# Patient Record
Sex: FEMALE | Race: WHITE | NOT HISPANIC OR LATINO | Employment: FULL TIME | URBAN - METROPOLITAN AREA
[De-identification: names, ages, dates, MRNs, and addresses within clinical notes are randomized per-mention and may not be internally consistent; named-entity substitution may affect disease eponyms.]

---

## 2022-12-05 ENCOUNTER — TRANSFERRED RECORDS (OUTPATIENT)
Dept: MULTI SPECIALTY CLINIC | Facility: CLINIC | Age: 52
End: 2022-12-05

## 2024-02-01 ENCOUNTER — TELEPHONE (OUTPATIENT)
Dept: OBGYN | Facility: CLINIC | Age: 54
End: 2024-02-01
Payer: COMMERCIAL

## 2024-02-08 ENCOUNTER — OFFICE VISIT (OUTPATIENT)
Dept: OBGYN | Facility: CLINIC | Age: 54
End: 2024-02-08
Payer: COMMERCIAL

## 2024-02-08 VITALS
SYSTOLIC BLOOD PRESSURE: 110 MMHG | WEIGHT: 179 LBS | TEMPERATURE: 97.8 F | DIASTOLIC BLOOD PRESSURE: 68 MMHG | HEART RATE: 89 BPM

## 2024-02-08 DIAGNOSIS — N93.9 ABNORMAL UTERINE BLEEDING (AUB): Primary | ICD-10-CM

## 2024-02-08 PROCEDURE — 58100 BIOPSY OF UTERUS LINING: CPT | Performed by: OBSTETRICS & GYNECOLOGY

## 2024-02-08 PROCEDURE — 88305 TISSUE EXAM BY PATHOLOGIST: CPT | Performed by: STUDENT IN AN ORGANIZED HEALTH CARE EDUCATION/TRAINING PROGRAM

## 2024-02-08 PROCEDURE — 99204 OFFICE O/P NEW MOD 45 MIN: CPT | Mod: 25 | Performed by: OBSTETRICS & GYNECOLOGY

## 2024-02-08 RX ORDER — TOPIRAMATE 50 MG/1
1 TABLET, FILM COATED ORAL AT BEDTIME
COMMUNITY
Start: 2023-12-08

## 2024-02-08 RX ORDER — HYDROCORTISONE 25 MG/G
CREAM TOPICAL 2 TIMES DAILY
COMMUNITY
Start: 2023-09-26

## 2024-02-08 RX ORDER — PROPRANOLOL HYDROCHLORIDE 80 MG/1
80 CAPSULE, EXTENDED RELEASE ORAL
COMMUNITY
Start: 2023-12-08

## 2024-02-08 RX ORDER — ASCORBIC ACID 250 MG
250 TABLET,CHEWABLE ORAL DAILY
COMMUNITY

## 2024-02-08 RX ORDER — RIZATRIPTAN BENZOATE 10 MG/1
10 TABLET ORAL
COMMUNITY
Start: 2023-12-08

## 2024-02-08 RX ORDER — TRIAMTERENE/HYDROCHLOROTHIAZID 37.5-25 MG
1 TABLET ORAL DAILY
COMMUNITY
Start: 2023-12-08

## 2024-02-08 RX ORDER — CYCLOSPORINE 0.5 MG/ML
1 EMULSION OPHTHALMIC EVERY 12 HOURS
COMMUNITY
Start: 2023-07-11

## 2024-02-08 RX ORDER — FOLIC ACID 20 MG
CAPSULE ORAL
COMMUNITY

## 2024-02-08 RX ORDER — POTASSIUM CHLORIDE 750 MG/1
10 TABLET, EXTENDED RELEASE ORAL
COMMUNITY
Start: 2023-12-08

## 2024-02-08 NOTE — PROGRESS NOTES
Gynecology Consult Note      HPI: Domonique Salazar is a 53 year old P1 presents for postmenopausal bleeding.  The patient states that she went through menopause a few years ago.  She has had on again off again bleeding over the last couple of years.  She did have a stretch without any bleeding for 1.5 years.  Notes that this year every couple of months she has had episodes of light bleeding.  No other associated symptoms including weight changes, new urinary or bowel symptoms.  She is interested in hysterectomy if bleeding were to continue.  Did have negative endometrial biopsy in 2020 and recent pelvic ultrasound completed.    ROS: 10 pt ROS neg other than HPI    PMH:   No past medical history on file.    PSHx:   No past surgical history on file.    Medications:   ascorbic acid (VITAMIN C) 250 MG CHEW chewable tablet, Take 250 mg by mouth daily  Cyanocobalamin (B-12) 1000 MCG TBCR,   cycloSPORINE (RESTASIS) 0.05 % ophthalmic emulsion, Apply 1 drop to eye every 12 hours  folic acid 20 MG CAPS,   hydrocortisone, Perianal, (ANUSOL-HC) 2.5 % cream, Apply topically 2 times daily  omeprazole (PRILOSEC) 20 MG DR capsule, Take 20 mg by mouth  potassium chloride ER (K-TAB/KLOR-CON) 10 MEQ CR tablet, Take 10 mEq by mouth  propranolol ER (INDERAL LA) 80 MG 24 hr capsule, Take 80 mg by mouth  rizatriptan (MAXALT) 10 MG tablet, Take 10 mg by mouth  topiramate (TOPAMAX) 50 MG tablet, Take 1 tablet by mouth at bedtime  triamterene-HCTZ (MAXZIDE-25) 37.5-25 MG tablet, Take 1 tablet by mouth daily  Vitamin D, Cholecalciferol, 10 MCG (400 UNIT) CHEW,     No current facility-administered medications on file prior to visit.       Allergies:    No Known Allergies    Social History:   Social History     Socioeconomic History    Marital status:      Spouse name: Not on file    Number of children: Not on file    Years of education: Not on file    Highest education level: Not on file   Occupational History    Not on file   Tobacco Use     Smoking status: Not on file    Smokeless tobacco: Not on file   Substance and Sexual Activity    Alcohol use: Not on file    Drug use: Not on file    Sexual activity: Not on file   Other Topics Concern    Not on file   Social History Narrative    Not on file     Social Determinants of Health     Financial Resource Strain: Not on file   Food Insecurity: Not on file   Transportation Needs: Not on file   Physical Activity: Not on file   Stress: Not on file   Social Connections: Not on file   Interpersonal Safety: Not on file   Housing Stability: Not on file       Family History:  No family history on file.    Physical Exam:   Vitals:    02/08/24 1020   BP: 110/68   BP Location: Left arm   Patient Position: Chair   Cuff Size: Adult Large   Pulse: 89   Temp: 97.8  F (36.6  C)   TempSrc: Tympanic   Weight: 81.2 kg (179 lb)      Gen: lying in bed, NAD  CV: Reg rate, well perfused  Pulm: no increased work of breathing  Abd: non-tender, non-distended, no masses   Pelvis: normal appearing external genitalia, vaginal mucosa, cervix, bimanual exam with normal size and contour of uterus with no adnexal masses  Extremities: non-tender, no erythema; no edema  Psych: normal mood and affect  Neuro: no focal deficits    [facility] Endometrial Biopsy Procedure Note    Domonique ELSY Marie  1970  [id number]    The patient was counseled on the risks (including including risk of infection, bleeding, recurrence), benefits, and alternatives of the procedure. Verbal and written consent were obtained.    Technique: The patient was placed in the dorsal lithotomy position.  A speculum was placed in the vagina and the cervix visualized. The cervix was cleaned with betadine swabs x3. The rocket curet was passed through the cervix to the fundus with return of scant tissue. This was placed in specimen jar and sent for permanent pathology. All instruments were removed.  The patient tolerated the procedure well.  She was given post op  instructions which included activity and pelvic restrictions.      Pelvic US      UTERUS: 5.2 x 3.3 x 4.5 cm. Normal in size and position with no masses.     ENDOMETRIUM: 5 mm. Normal smooth endometrium. Normal color Doppler flow.     RIGHT OVARY: Obscured by bowel gas.     LEFT OVARY: Obscured by bowel gas.     A&P Domonique Salazar is a 53 year old P1 who presents with recurrent postmenopausal bleeding.  The patient had recent pelvic ultrasound which was notable for an endometrial stripe of 5 mm.  She notes multiple episodes over the last couple of years.  She is unsure of exactly when she went through menopause but did have a period of no bleeding whatsoever for 1.5 years .  The last 2 months she has had several episodes of bleeding and spotting.  Discussed with patient that given recurrent postmenopausal bleeding would repeat endometrial biopsy given that the last have been completed in 2020.  Discussed that pending endometrial biopsy results will determine plan of care.  Discussed that if significant hyperplasia with atypia or cancer, would recommend referral to gynecologic oncology.  Benign pathology could consider further monitoring versus hysterectomy.  Discussed that if she has simple hyperplasia and has had ongoing issues with irregular bleeding would recommend hysterectomy as well.  Patient states understanding, agreement with plan of care.  Endometrial biopsy collected as above without difficulty.  Patient tolerated procedure well.    Separate from the procedure I spent 45 minutes reviewing chart, obtaining history, counseling, examining, coordinating care, documenting this encounter.    Ale Farooq MD   2/8/2024 1:32 PM

## 2024-02-08 NOTE — NURSING NOTE
Initial /68 (BP Location: Left arm, Patient Position: Chair, Cuff Size: Adult Large)   Pulse 89   Temp 97.8  F (36.6  C) (Tympanic)   Wt 81.2 kg (179 lb)   LMP 11/07/2023 (Approximate)  There is no height or weight on file to calculate BMI. .    Carly Mccord MA

## 2024-02-12 LAB
PATH REPORT.COMMENTS IMP SPEC: NORMAL
PATH REPORT.COMMENTS IMP SPEC: NORMAL
PATH REPORT.FINAL DX SPEC: NORMAL
PATH REPORT.GROSS SPEC: NORMAL
PATH REPORT.MICROSCOPIC SPEC OTHER STN: NORMAL
PATH REPORT.RELEVANT HX SPEC: NORMAL
PHOTO IMAGE: NORMAL

## 2024-03-03 ENCOUNTER — HEALTH MAINTENANCE LETTER (OUTPATIENT)
Age: 54
End: 2024-03-03

## 2025-03-15 ENCOUNTER — HEALTH MAINTENANCE LETTER (OUTPATIENT)
Age: 55
End: 2025-03-15